# Patient Record
Sex: MALE | Race: WHITE
[De-identification: names, ages, dates, MRNs, and addresses within clinical notes are randomized per-mention and may not be internally consistent; named-entity substitution may affect disease eponyms.]

---

## 2018-12-08 ENCOUNTER — HOSPITAL ENCOUNTER (EMERGENCY)
Dept: HOSPITAL 25 - ED | Age: 23
LOS: 1 days | Discharge: HOME | End: 2018-12-09
Payer: COMMERCIAL

## 2018-12-08 DIAGNOSIS — R11.0: ICD-10-CM

## 2018-12-08 DIAGNOSIS — R07.89: Primary | ICD-10-CM

## 2018-12-08 DIAGNOSIS — R19.7: ICD-10-CM

## 2018-12-08 PROCEDURE — 36415 COLL VENOUS BLD VENIPUNCTURE: CPT

## 2018-12-08 PROCEDURE — 83605 ASSAY OF LACTIC ACID: CPT

## 2018-12-08 PROCEDURE — 83690 ASSAY OF LIPASE: CPT

## 2018-12-08 PROCEDURE — 85025 COMPLETE CBC W/AUTO DIFF WBC: CPT

## 2018-12-08 PROCEDURE — 84484 ASSAY OF TROPONIN QUANT: CPT

## 2018-12-08 PROCEDURE — 80053 COMPREHEN METABOLIC PANEL: CPT

## 2018-12-08 PROCEDURE — 93005 ELECTROCARDIOGRAM TRACING: CPT

## 2018-12-08 PROCEDURE — 99283 EMERGENCY DEPT VISIT LOW MDM: CPT

## 2018-12-08 PROCEDURE — 71045 X-RAY EXAM CHEST 1 VIEW: CPT

## 2018-12-09 VITALS — DIASTOLIC BLOOD PRESSURE: 69 MMHG | SYSTOLIC BLOOD PRESSURE: 119 MMHG

## 2018-12-09 LAB
BASOPHILS # BLD AUTO: 0 10^3/UL (ref 0–0.2)
EOSINOPHIL # BLD AUTO: 0.1 10^3/UL (ref 0–0.6)
HCT VFR BLD AUTO: 49 % (ref 42–52)
HGB BLD-MCNC: 16.4 G/DL (ref 14–18)
LYMPHOCYTES # BLD AUTO: 0.7 10^3/UL (ref 1–4.8)
MCH RBC QN AUTO: 30 PG (ref 27–31)
MCHC RBC AUTO-ENTMCNC: 34 G/DL (ref 31–36)
MCV RBC AUTO: 90 FL (ref 80–94)
MONOCYTES # BLD AUTO: 0.6 10^3/UL (ref 0–0.8)
NEUTROPHILS # BLD AUTO: 7.3 10^3/UL (ref 1.5–7.7)
NRBC # BLD AUTO: 0 10^3/UL
NRBC BLD QL AUTO: 0
PLATELET # BLD AUTO: 180 10^3/UL (ref 150–450)
RBC # BLD AUTO: 5.43 10^6/UL (ref 4–5.4)
WBC # BLD AUTO: 8.6 10^3/UL (ref 3.5–10.8)

## 2018-12-09 NOTE — ED
HPI Chest Pain





- HPI Summary


HPI Summary: 


Patient is a 23-year-old otherwise healthy male presenting to the ED with a one-

day history of R sided chest wall pain radiating to the RLQ and Left lateral 

chest wall pain radiating to the mid abdomen.  Patient states he has also been 

experiencing diarrhea, however this is very normal for him.  He has never been 

diagnosed with IBS, but believes he may have this.  Symptoms began acutely onset

, without shortness of breath.  He states he does have a history of anxiety, 

but states this feels different than his previous episodes.  He appears well on 

arrival.  Patient is [facility]febrile [facility] stable.  Denies any 

worsening pain with palpation to the anterior chest wall.  Denies any worsening 

pain with positioning.  He denies any photophobia, neck pain, headache, sinus 

pressure or congestion.  Endorses mild nausea, but denies any vomiting.








- History of Current Complaint


Chief Complaint: EDChestWallPain


Time Seen by Provider: 12/09/18 00:55


Hx Obtained From: Patient


Onset/Duration: Started Hours Ago


Timing: Constant


Initial Severity: Moderate


Current Severity: Moderate


Pain Intensity: 7


Pain Scale Used: 0-10 Numeric


Chest Pain Location: Right Anterior, Left Lateral


Chest Pain Radiates: Yes


Chest Pain Radiates To:: Other - mid lower abdomen


Character: Burning, Dull/Aching, Sharp/Stabbing


Aggravating Factor(s): Nothing


Alleviating Factor(s): Nothing


Associated Signs and Symptoms: Positive: Negative





- Risk Factors


Pulmonary Embolism Risk Factors: Negative


TAD Risk Factors: Negative





- Allergy/Home Medications


Allergies/Adverse Reactions: 


 Allergies











Allergy/AdvReac Type Severity Reaction Status Date / Time


 


No Known Allergies Allergy   Verified 12/08/18 22:28














PMH/Surg Hx/FS Hx/Imm Hx


Previously Healthy: Yes


Endocrine/Hematology History: 


   Denies: Hx Diabetes


Cardiovascular History: 


   Denies: Hx Hypertension, Hx Pacemaker/ICD


 History: 


   Denies: Hx Renal Disease


Sensory History: 


   Denies: Hx Hearing Aid


Psychiatric History: 


   Denies: Hx Panic Disorder





- Immunization History


Hx Pertussis Vaccination: No


Immunizations Up to Date: Yes


Infectious Disease History: No


Infectious Disease History: 


   Denies: Traveled Outside the [place] in Last 30 Days





- Family History


Known Family History: 


   Negative: Diabetes





- Social History


Occupation: Unemployed


Lives: With Family


Alcohol Use: Occasionally


Hx Substance Use: No


Substance Use Type: Reports: None


Hx Tobacco Use: No


Smoking Status (MU): Never Smoked Tobacco





Review of Systems


Constitutional: Negative


Negative: Fever, Chills, Fatigue, Skin Diaphoresis


Negative: Epistaxis, Dental Pain, Sore Throat, Ear Ache


Positive: Chest Pain


Negative: Shortness Of Breath, Cough


Positive: Nausea.  Negative: Abdominal Pain, Vomiting, Diarrhea


Genitourinary: Negative


Positive: no symptoms reported, see HPI


Negative: Arthralgia, Myalgia


Skin: Negative


Positive: Anxious


All Other Systems Reviewed And Are Negative: Yes





Physical Exam


Triage Information Reviewed: Yes


Vital Signs On Initial Exam: 


 Initial Vitals











Temp Pulse Resp BP Pulse Ox


 


 98.2 F   97   16   140/74   99 


 


 12/08/18 22:27  12/08/18 22:27  12/08/18 22:27  12/08/18 22:27  12/08/18 22:27











Vital Signs Reviewed: Yes


Appearance: Positive: Well-Appearing, Well-Nourished


Skin: Positive: Warm, Skin Color Reflects Adequate Perfusion


Head/Face: Positive: Normal Head/Face Inspection


Eyes: Positive: EOMI, LUKE, Conjunctiva Clear


Neck: Positive: Supple, No Lymphadenopathy


Respiratory/Lung Sounds: Positive: Clear to Auscultation, Breath Sounds Present


Cardiovascular: Positive: RRR, Pulses are Symmetrical in both Upper and Lower 

Extremities


Musculoskeletal: Positive: Strength/ROM Intact


Neurological: Positive: Speech Normal


Psychiatric: Positive: Normal, Affect/Mood Appropriate


AVPU Assessment: Alert





Diagnostics





- Vital Signs


 Vital Signs











  Temp Pulse Resp BP Pulse Ox


 


 12/09/18 01:00   78  19   96


 


 12/09/18 00:53   80  20   96


 


 12/09/18 00:52   78  20  132/74  96


 


 12/09/18 00:29  100.2 F  64  16  126/66  97


 


 12/08/18 22:27  98.2 F  97  16  140/74  99














- Laboratory


Result Diagrams: 


 12/09/18 01:19





 12/09/18 01:19


Lab Statement: Any lab studies that have been ordered have been reviewed, and 

results considered in the medical decision making process.





Chest Pain Course/Dx





- Course


Course Of Treatment: Patient's evaluated for right sided stabbing chest pain 

which is intermittent in nature, acute onset this morning.  He is also 

endorsing some left-sided lateral chest pain which radiates to the mid lower 

abdomen.  He also has endorsing some diarrhea, but states is his baseline.  

Labs obtained which are all WNL.  Including a troponin which was 0.00.  Chest x-

ray obtained which shows no acute cardiopulmonary disease.  EKG shows normal 

sinus rhythm.  Vital signs are stable and patient remains afebrile.  One 

reading shows 100.2, however on recheck, he is 98.2.  He denies any fevers, 

sweats, chills.  He is given ibuprofen at home with good relief of his 

abdominal symptoms, however continues to state he has slight right-sided chest 

pain, however this has improved greatly since he has had his ibuprofen.  He is 

also given Zofran and no longer complains of nausea.  He denies any personal 

history of cardiac issues or family history of cardiac issues.  I believe he is 

a safe discharge at this time and will follow up with gastroenterology for his 

ongoing symptoms of his diarrhea over the course of several years.  He 

understands to return to the ED if he develops any worsening chest pain or any 

additional symptoms.  He understands these return precautions.





- Diagnoses


Provider Diagnoses: 


 Atypical chest pain, Diarrhea








Discharge





- Sign-Out/Discharge


Documenting (check all that apply): Patient Departure





- Discharge Plan


Condition: Stable


Disposition: HOME


Prescriptions: 


Ondansetron ODT TAB* [Zofran 4 MG Odt TAB*] 4 mg PO Q6H PRN #12 tab.odt MDD 4


 PRN Reason: Nausea


Referrals: 


Barrie Blevins DO [Doctor of Osteopathy] - 


Scott Palomares DO [Primary Care Provider] - 


Additional Instructions: 


Please follow up with gastroenterology regarding your abdominal pain 


zofran has been prescribed to you 


as discussed, if you develop any worsening symptoms, return to the ED





- Billing Disposition and Condition


Condition: STABLE


Disposition: Home